# Patient Record
Sex: FEMALE | Race: BLACK OR AFRICAN AMERICAN | NOT HISPANIC OR LATINO | URBAN - METROPOLITAN AREA
[De-identification: names, ages, dates, MRNs, and addresses within clinical notes are randomized per-mention and may not be internally consistent; named-entity substitution may affect disease eponyms.]

---

## 2024-04-20 ENCOUNTER — OFFICE VISIT (OUTPATIENT)
Dept: URGENT CARE | Facility: CLINIC | Age: 54
End: 2024-04-20
Payer: COMMERCIAL

## 2024-04-20 VITALS
SYSTOLIC BLOOD PRESSURE: 139 MMHG | HEART RATE: 103 BPM | RESPIRATION RATE: 18 BRPM | TEMPERATURE: 98.6 F | OXYGEN SATURATION: 99 % | DIASTOLIC BLOOD PRESSURE: 94 MMHG

## 2024-04-20 DIAGNOSIS — J06.9 ACUTE URI: Primary | ICD-10-CM

## 2024-04-20 DIAGNOSIS — L02.11 ABSCESS, NECK: ICD-10-CM

## 2024-04-20 PROCEDURE — 99204 OFFICE O/P NEW MOD 45 MIN: CPT | Performed by: PHYSICIAN ASSISTANT

## 2024-04-20 RX ORDER — METHYLPREDNISOLONE 4 MG/1
TABLET ORAL
Qty: 21 TABLET | Refills: 0 | Status: SHIPPED | OUTPATIENT
Start: 2024-04-20

## 2024-04-20 RX ORDER — SPIRONOLACTONE 50 MG/1
50 TABLET, FILM COATED ORAL DAILY
COMMUNITY
Start: 2024-04-01

## 2024-04-20 RX ORDER — SUMATRIPTAN 25 MG/1
25 TABLET, FILM COATED ORAL
COMMUNITY

## 2024-04-20 RX ORDER — ONDANSETRON 4 MG/1
4 TABLET, FILM COATED ORAL 3 TIMES DAILY
COMMUNITY

## 2024-04-20 RX ORDER — RIZATRIPTAN BENZOATE 10 MG/1
TABLET ORAL
COMMUNITY
Start: 2024-04-01

## 2024-04-20 RX ORDER — LORATADINE PSEUDOEPHEDRINE SULFATE 10; 240 MG/1; MG/1
TABLET, EXTENDED RELEASE ORAL
COMMUNITY
Start: 2024-04-05

## 2024-04-20 RX ORDER — ALBUTEROL SULFATE 90 UG/1
2 AEROSOL, METERED RESPIRATORY (INHALATION) EVERY 6 HOURS PRN
COMMUNITY
Start: 2024-04-01

## 2024-04-20 RX ORDER — OMEPRAZOLE 40 MG/1
40 CAPSULE, DELAYED RELEASE ORAL DAILY
COMMUNITY
Start: 2024-04-01

## 2024-04-20 RX ORDER — CEPHALEXIN 500 MG/1
500 CAPSULE ORAL EVERY 8 HOURS SCHEDULED
Qty: 21 CAPSULE | Refills: 0 | Status: SHIPPED | OUTPATIENT
Start: 2024-04-20 | End: 2024-04-27

## 2024-04-20 NOTE — PROGRESS NOTES
[facility]        NAME: Юлия Larson is a 54 y.o. female  : 1970    MRN: [id number]  DATE: 2024  TIME: 12:22 PM      Assessment and Plan     Acute URI [J06.9]  1. Acute URI  methylPREDNISolone 4 MG tablet therapy pack      2. Abscess, neck  cephalexin (KEFLEX) 500 mg capsule        Note:   Suspect viral etiology of URI - Rx steroids since patient taking OTC medications and not having much relief.   Abscess in skin of neck - Rx antibiotics  Firm, not fluctuant, so unable to shruthi     Patient Instructions   There are no Patient Instructions on file for this visit.     Follow up with PCP in 3-5 days.  Go to ER if symptoms worsen.    Chief Complaint     Chief Complaint   Patient presents with    Cough     Patient here with cough, congestion, chills, and sore throat for a few days now.     Insect Bite     Patient also has a tender bump on her neck which she thinks may be some sort of insect bite that she noticed yesterday. She said it was smaller yesterday and has now grown in size overnight.          History of Present Illness     Patient presents with cough, nasal congestion, chills, and sore throat x 3 days. She has been taking Tylenol and Tessalon perles with mild relief. She also states there was a possible insect bite on her neck yesterday and today it got bigger.     Cough  Associated symptoms include chills and a sore throat. Pertinent negatives include no chest pain, ear pain, fever, headaches, myalgias, postnasal drip, rash, rhinorrhea or shortness of breath.   Insect Bite  Associated symptoms include chills, congestion, coughing and a sore throat. Pertinent negatives include no abdominal pain, arthralgias, chest pain, fatigue, fever, headaches, myalgias, nausea, numbness, rash or vomiting.       Review of Systems     Review of Systems   Constitutional:  Positive for chills. Negative for fatigue and fever.   HENT:  Positive for congestion and sore throat. Negative for ear pain,  postnasal drip, rhinorrhea, sinus pressure and sinus pain.    Eyes:  Negative for pain and visual disturbance.   Respiratory:  Positive for cough. Negative for chest tightness and shortness of breath.    Cardiovascular:  Negative for chest pain and palpitations.   Gastrointestinal:  Negative for abdominal pain, diarrhea, nausea and vomiting.   Genitourinary:  Negative for dysuria and hematuria.   Musculoskeletal:  Negative for arthralgias, back pain and myalgias.   Skin:  Positive for wound. Negative for rash.   Neurological:  Negative for dizziness, seizures, syncope, numbness and headaches.   All other systems reviewed and are negative.        Current Medications       Current Outpatient Medications:     albuterol (PROVENTIL HFA,VENTOLIN HFA) 90 mcg/act inhaler, Inhale 2 puffs every 6 (six) hours as needed, Disp: , Rfl:     cephalexin (KEFLEX) 500 mg capsule, Take 1 capsule (500 mg total) by mouth every 8 (eight) hours for 7 days, Disp: 21 capsule, Rfl: 0    Claritin-D 24 Hour  MG per 24 hr tablet, 1 TABLET DAILY, Disp: , Rfl:     methylPREDNISolone 4 MG tablet therapy pack, Use as directed on package, Disp: 21 tablet, Rfl: 0    metoprolol tartrate (LOPRESSOR) 25 mg tablet, Take 25 mg by mouth 2 (two) times a day, Disp: , Rfl:     omeprazole (PriLOSEC) 40 MG capsule, Take 40 mg by mouth daily, Disp: , Rfl:     ondansetron (ZOFRAN) 4 mg tablet, Take 4 mg by mouth 3 (three) times a day, Disp: , Rfl:     rizatriptan (MAXALT) 10 mg tablet, TAKE 1 TABLET BY MOUTH ONCE DAILY, MAY REPEAT DOSE IN 2 HOURS IF HEADACHE PERSISTS, Disp: , Rfl:     spironolactone (ALDACTONE) 50 mg tablet, Take 50 mg by mouth daily, Disp: , Rfl:     SUMAtriptan (IMITREX) 25 mg tablet, Take 25 mg by mouth, Disp: , Rfl:     Current Allergies     Allergies as of 04/20/2024    (No Known Allergies)              The following portions of the patient's history were reviewed and updated as appropriate: allergies, current medications, past family  history, past medical history, past social history, past surgical history, and problem list.     Past Medical History:   Diagnosis Date    Hypertension     Migraines        Past Surgical History:   Procedure Laterality Date    HYSTERECTOMY  2016       History reviewed. No pertinent family history.      Medications have been verified.        Objective     /94   Pulse 103   Temp 98.6 °F (37 °C)   Resp 18   SpO2 99%   No LMP recorded.         Physical Exam     Physical Exam  Vitals and nursing note reviewed.   Constitutional:       Appearance: Normal appearance. She is normal weight.   HENT:      Head: Normocephalic and atraumatic.      Nose: Nose normal.      Mouth/Throat:      Mouth: Mucous membranes are moist.      Pharynx: Oropharynx is clear.   Cardiovascular:      Rate and Rhythm: Normal rate and regular rhythm.      Heart sounds: Normal heart sounds.   Pulmonary:      Effort: Pulmonary effort is normal.      Breath sounds: Normal breath sounds.   Skin:     General: Skin is warm and dry.      Findings: Lesion present.      Comments: Tender, chickpea-sized, abscess on right anterior neck with mild local erythema. Firm, not fluctuant.    Neurological:      General: No focal deficit present.      Mental Status: She is alert and oriented to person, place, and time.   Psychiatric:         Mood and Affect: Mood normal.         Behavior: Behavior normal.